# Patient Record
Sex: MALE | Race: ASIAN | NOT HISPANIC OR LATINO | Employment: FULL TIME | URBAN - METROPOLITAN AREA
[De-identification: names, ages, dates, MRNs, and addresses within clinical notes are randomized per-mention and may not be internally consistent; named-entity substitution may affect disease eponyms.]

---

## 2021-01-06 ENCOUNTER — OFFICE VISIT (OUTPATIENT)
Dept: FAMILY MEDICINE | Facility: CLINIC | Age: 36
End: 2021-01-06
Payer: COMMERCIAL

## 2021-01-06 VITALS
HEIGHT: 68 IN | SYSTOLIC BLOOD PRESSURE: 109 MMHG | HEART RATE: 62 BPM | WEIGHT: 113 LBS | TEMPERATURE: 97.6 F | RESPIRATION RATE: 20 BRPM | OXYGEN SATURATION: 97 % | BODY MASS INDEX: 17.13 KG/M2 | DIASTOLIC BLOOD PRESSURE: 75 MMHG

## 2021-01-06 DIAGNOSIS — K21.9 GASTROESOPHAGEAL REFLUX DISEASE WITHOUT ESOPHAGITIS: Primary | ICD-10-CM

## 2021-01-06 PROCEDURE — 99213 OFFICE O/P EST LOW 20 MIN: CPT | Mod: GC | Performed by: STUDENT IN AN ORGANIZED HEALTH CARE EDUCATION/TRAINING PROGRAM

## 2021-01-06 SDOH — HEALTH STABILITY: MENTAL HEALTH: HOW OFTEN DO YOU HAVE A DRINK CONTAINING ALCOHOL?: NOT ASKED

## 2021-01-06 SDOH — HEALTH STABILITY: MENTAL HEALTH: HOW MANY STANDARD DRINKS CONTAINING ALCOHOL DO YOU HAVE ON A TYPICAL DAY?: NOT ASKED

## 2021-01-06 SDOH — HEALTH STABILITY: MENTAL HEALTH: HOW OFTEN DO YOU HAVE 6 OR MORE DRINKS ON ONE OCCASION?: NOT ASKED

## 2021-01-06 ASSESSMENT — MIFFLIN-ST. JEOR: SCORE: 1407.57

## 2021-01-06 NOTE — PATIENT INSTRUCTIONS
Patient Education     GERD (Adult)    The esophagus is a tube that carries food from the mouth to the stomach. A valve (the LES, lower esophageal sphincter) at the lower end of the esophagus prevents stomach acid from flowing upward. When this valve doesn't work properly, stomach contents may repeatedly flow back up (reflux) into the esophagus. This is called gastroesophageal reflux disease (GERD). GERD can irritate the esophagus. It can cause problems with pain, swallowing or breathing. In severe cases, GERD can cause recurrent pneumonia (from aspiration or breathing in particles) or other serious problems.  Symptoms of reflux include burning, pressure or sharp pain in the upper abdomen or mid to lower chest. The pain can spread to the neck, back, or shoulder. There may be belching, an acid taste in the back of the throat, chronic cough, or sore throat, or hoarseness. GERD symptoms often occur during the day after a big meal. They can also occur at night when lying down.   Home care  Lifestyle changes can help reduce symptoms. If needed, your healthcare provider may prescribe medicines. Symptoms often improve with treatment, but if treatment is stopped, the symptoms often return after a few months. So most persons with GERD will need to continue treatment or get treatment on and off.  Lifestyle changes    Limit or avoid fatty, fried, and spicy foods, as well as coffee, chocolate, mint, and foods with high acid content such as tomatoes and citrus fruit and juices (orange, grapefruit, lemon).    Don t eat large meals, especially at night. Frequent, smaller meals are best. Don't lie down right after eating. And don t eat anything 3 hours before going to bed.    Don't drink alcohol or smoke. As much as possible, stay away from second hand smoke.    If you are overweight, losing weight will reduce symptoms.     Don't wear tight clothing around your stomach area.    If your symptoms occur during sleep, use a foam wedge  "to elevate your upper body (not just your head.) Or, place 4\" blocks under the head of your bed. Or use 2 bed risers under your bedframe.  Medicines  If needed, medicines can help relieve the symptoms of GERD and prevent damage to the esophagus. Discuss a medicine plan with your healthcare provider. This may include one or more of the following medicines:    Antacids to help neutralize the normal acids in your stomach.    Acid blockers (Histamine or H2 blockers) to decrease acid production.    Acid inhibitors (proton pump inhibitors PPIs) to decrease acid production in a different way than the blockers. They may work better, but can take a little longer to take effect.  Take an antacid 30 to 60 minutes after eating and at bedtime, but not at the same time as an acid blocker.  Try not to take medicines such as ibuprofen and aspirin. If you are taking aspirin for your heart or other medical reasons, talk to your healthcare provider about stopping it.  Follow-up care  Follow up with your healthcare provider or as advised by our staff.  When to seek medical advice  Call your healthcare provider if any of the following occur:    Stomach pain gets worse or moves to the lower right abdomen (appendix area)    Chest pain appears or gets worse, or spreads to the back, neck, shoulder, or arm    An over-the-counter trial of medicine doesn't relieve your symptoms    Weight loss that can't be explained    Trouble or pain swallowing    Frequent vomiting (can t keep down liquids)    Blood in the stool or vomit (red or black in color)    Feeling weak or dizzy    Fever of 100.4 F (38 C) or higher, or as directed by your healthcare provider  [place] last reviewed this educational content on 3/1/2018    2538-6229 [employer]. [address] [place] [address], [place] [address]. All rights reserved. This information is not intended as a substitute for professional medical care. Always follow your healthcare professional's " instructions.

## 2021-01-06 NOTE — PROGRESS NOTES
HPI:       Mando Hebert is a 35 year old male presents for the new concern(s) of.    Chief Complaint   Patient presents with     [facility]     Gastrophageal Reflux     burning acid reflux, [id number]     Medication Reconciliation     Completed        ABDOMINAL   Pain     Onset: 3 weeks, unsure if associated for feeds    Description:   Character: Burning  Location: epigastric region  Radiation: sometimes radiates to lower right    Intensity: moderate    Progression of Symptoms:  improving    Accompanying Signs & Symptoms:  Fever/Chills?: No   Gas/Bloating:  YES   Dysuria:No   Hematuria: No   Nausea: No   Vomiting: No   Diarrhea:No   Constipation: Regular bowel movements; admits to constipation 1-2x week    History:           Trauma: No   No surgeries  Previous similar pain:  Yes - 2019, not as bad, resolved  Previous tests done: none    Precipitating factors:   Does the pain change with:     Food?: no     BM?: sometimes     Urination:no     What makes it better?: TUMS    Therapies Tried and outcome: Nothing    Medication Reconciliation completed    No  was used for this visit.          Review of Systems:     C: NEGATIVE for fatigue, unexpected change in weight  E: NEGATIVE for acute vision problems or changes  R: NEGATIVE for significant cough or shortness of breath  CV: NEGATIVE for chest pain, palpitations or new or worsening peripheral edema  P: NEGATIVE for changes in mood or affect            PMHX:     Patient Active Problem List   Diagnosis     Atopic dermatitis     Neck mass       No current outpatient medications on file.              Allergies   Allergen Reactions     Nkda [No Known Drug Allergies]        No results found for this or any previous visit (from the past 24 hour(s)).            Physical Exam:     Vitals:    01/06/21 1115   BP: 109/75   BP Location: Right arm   Patient Position: Sitting   Cuff Size: Adult Regular   Pulse: 62   Resp: 20   Temp: 97.6  F (36.4  C)   TempSrc:  "Oral   SpO2: 97%   Weight: 51.3 kg (113 lb)   Height: 1.72 m (5' 7.72\")     Body mass index is 17.33 kg/m .    GENERAL APPEARANCE: healthy, alert and no distress,  RESP: lungs clear to auscultation - no rales, rhonchi or wheezes  CV: regular rate and rhythm,  and no murmur, click,  rub or gallop  ABDOMEN: soft, tenderness in the epigastrium, without hepatosplenomegaly or masses  MS: extremities normal- no gross deformities noted  SKIN: no suspicious lesions or rashes      Assessment and Plan     (K21.9) Gastroesophageal reflux disease without esophagitis  (primary encounter diagnosis)  Comment: HPI and physical examination suggestive of GERD. No evidence of fevers, nausea, vomiting, bowel or bladder concerns. At this point, less likely appendicitis, pancreatitis, peritonitis, cholecystitis, pyelonephritis due to unremarkable HPI and abdominal exam. Discussed this with the patient and recommended initiation of PPI for GERD. Mando is agreeable to this.    Plan:   -Trial of 14-day of esomeprazole (NEXIUM) 20 MG DR capsule  -Follow-up of symptoms on 02/08/2021; will consider H. Pylori testing if epigastric pain continues.    Options for treatment and follow-up care were reviewed with the patient and/or guardian. Pt and/or guardian engaged in the decision making process and verbalized understanding of the options discussed and agreed with the final plan.    Precepted today with: MD James Bryant MD, MPH (PGY 3)  [facility] Family Medicine Resident  Pager: [phone]        "

## 2021-01-13 NOTE — PROGRESS NOTES
Preceptor Attestation:  Patient's case reviewed and discussed with VINICIO OWEN MD resident and I evaluated the patient. I agree with written assessment and plan of care.  Supervising Physician:  Cecil Gutierrez MD, MD [facility]

## 2021-01-15 ENCOUNTER — HEALTH MAINTENANCE LETTER (OUTPATIENT)
Age: 36
End: 2021-01-15

## 2021-06-02 ENCOUNTER — RECORDS - HEALTHEAST (OUTPATIENT)
Dept: ADMINISTRATIVE | Facility: CLINIC | Age: 36
End: 2021-06-02

## 2021-09-26 ENCOUNTER — HEALTH MAINTENANCE LETTER (OUTPATIENT)
Age: 36
End: 2021-09-26

## 2022-01-16 ENCOUNTER — HEALTH MAINTENANCE LETTER (OUTPATIENT)
Age: 37
End: 2022-01-16

## 2023-04-23 ENCOUNTER — HEALTH MAINTENANCE LETTER (OUTPATIENT)
Age: 38
End: 2023-04-23

## 2024-03-01 ENCOUNTER — OFFICE VISIT (OUTPATIENT)
Dept: FAMILY MEDICINE | Facility: CLINIC | Age: 39
End: 2024-03-01
Payer: COMMERCIAL

## 2024-03-01 VITALS
TEMPERATURE: 97.8 F | RESPIRATION RATE: 18 BRPM | HEART RATE: 82 BPM | OXYGEN SATURATION: 97 % | SYSTOLIC BLOOD PRESSURE: 126 MMHG | WEIGHT: 117 LBS | BODY MASS INDEX: 18.36 KG/M2 | HEIGHT: 67 IN | DIASTOLIC BLOOD PRESSURE: 82 MMHG

## 2024-03-01 DIAGNOSIS — Z13.220 LIPID SCREENING: ICD-10-CM

## 2024-03-01 DIAGNOSIS — F10.10 ALCOHOL ABUSE, EPISODIC DRINKING BEHAVIOR: ICD-10-CM

## 2024-03-01 DIAGNOSIS — K92.1 MELENA: ICD-10-CM

## 2024-03-01 DIAGNOSIS — R10.13 EPIGASTRIC PAIN: Primary | ICD-10-CM

## 2024-03-01 LAB
ALBUMIN SERPL-MCNC: 3.9 G/DL (ref 3.4–5)
ALP SERPL-CCNC: 68 U/L (ref 40–150)
ALT SERPL W P-5'-P-CCNC: 35 U/L (ref 0–70)
ANION GAP SERPL CALCULATED.3IONS-SCNC: 2 MMOL/L (ref 3–14)
AST SERPL W P-5'-P-CCNC: 29 U/L (ref 0–45)
BILIRUB SERPL-MCNC: 1.2 MG/DL (ref 0.2–1.3)
BUN SERPL-MCNC: 14 MG/DL (ref 7–30)
CALCIUM SERPL-MCNC: 10 MG/DL (ref 8.5–10.1)
CHLORIDE BLD-SCNC: 107 MMOL/L (ref 94–109)
CO2 SERPL-SCNC: 28 MMOL/L (ref 20–32)
CREAT SERPL-MCNC: 1.1 MG/DL (ref 0.66–1.25)
EGFRCR SERPLBLD CKD-EPI 2021: 87 ML/MIN/1.73M2
ERYTHROCYTE [DISTWIDTH] IN BLOOD BY AUTOMATED COUNT: 13.2 % (ref 10–15)
GLUCOSE BLD-MCNC: 120 MG/DL (ref 70–99)
HCT VFR BLD AUTO: 44 % (ref 40–53)
HGB BLD-MCNC: 14.6 G/DL (ref 13.3–17.7)
INR PPP: 1.14 (ref 0.85–1.15)
MCH RBC QN AUTO: 31.1 PG (ref 26.5–33)
MCHC RBC AUTO-ENTMCNC: 33.2 G/DL (ref 31.5–36.5)
MCV RBC AUTO: 94 FL (ref 78–100)
PLATELET # BLD AUTO: 223 10E3/UL (ref 150–450)
POTASSIUM BLD-SCNC: 4.2 MMOL/L (ref 3.4–5.3)
PROT SERPL-MCNC: 7.2 G/DL (ref 6.8–8.8)
RBC # BLD AUTO: 4.7 10E6/UL (ref 4.4–5.9)
SODIUM SERPL-SCNC: 137 MMOL/L (ref 135–145)
WBC # BLD AUTO: 5.8 10E3/UL (ref 4–11)

## 2024-03-01 PROCEDURE — 80053 COMPREHEN METABOLIC PANEL: CPT | Performed by: FAMILY MEDICINE

## 2024-03-01 PROCEDURE — 82728 ASSAY OF FERRITIN: CPT | Performed by: FAMILY MEDICINE

## 2024-03-01 PROCEDURE — 85027 COMPLETE CBC AUTOMATED: CPT | Performed by: FAMILY MEDICINE

## 2024-03-01 PROCEDURE — 36415 COLL VENOUS BLD VENIPUNCTURE: CPT | Performed by: FAMILY MEDICINE

## 2024-03-01 PROCEDURE — 80061 LIPID PANEL: CPT | Performed by: FAMILY MEDICINE

## 2024-03-01 PROCEDURE — 99204 OFFICE O/P NEW MOD 45 MIN: CPT | Performed by: FAMILY MEDICINE

## 2024-03-01 PROCEDURE — 85610 PROTHROMBIN TIME: CPT | Performed by: FAMILY MEDICINE

## 2024-03-01 RX ORDER — OMEPRAZOLE 40 MG/1
40 CAPSULE, DELAYED RELEASE ORAL DAILY
Qty: 30 CAPSULE | Refills: 0 | Status: SHIPPED | OUTPATIENT
Start: 2024-03-01

## 2024-03-01 ASSESSMENT — PAIN SCALES - GENERAL: PAINLEVEL: MILD PAIN (3)

## 2024-03-01 NOTE — PATIENT INSTRUCTIONS
- work on cutting back on alcohol. I'd advise not drinking at all since this could trigger worsening symptoms of abdominal pain and bleeding  - start taking omeprazole 40 mg once daily until you follow up  - avoid ibuprofen since this could increase your risk of bleeding  - okay to take Tums as needed in addition to the omeprazole for belly pain  - Follow up in 2 weeks to reassess your symptoms

## 2024-03-01 NOTE — PROGRESS NOTES
Assessment & Plan     Epigastric pain  Melena  One episode x 2 days earlier this week. Now epigastric pain. History of GERD. Current episode occurred shortly after excessive binge drinking. Has a chronic history of weekend binge drinking. No hematemesis. Vitals WNL. No anemia on labs today. Has now cleared. Associated epigastric pain raises concern for PUD or gastric ulcer or acute gastritis from alcohol use.   - CBC with platelets; Future  - INR; Future  - Comprehensive metabolic panel; Future  - Ferritin; Future  - CBC with platelets  - INR  - Comprehensive metabolic panel  - Ferritin  - omeprazole (PRILOSEC) 40 MG DR capsule; Take 1 capsule (40 mg) by mouth daily  - Alcohol abstinence.  - Avoid NSAIDs.  - Would benefit from endoscopy. Given stable vitals and no anemia, will start PPI and revisit in follow-up.   - Reviewed precautions for follow-up urgently at the ED - recurrent melena, worsening epigastric pain despite PPI use, lightheadedness, racing heart, shortness of breath.      Lipid screening  Due for routine lipid screening.  - Lipid panel; Future  - Lipid panel    Alcohol abuse, episodic drinking behavior  1-2 days per week of heavy drinking to the point of intoxication and black outs. He doesn't know how many drinks he typically has in one sitting. Has tried to cut back and hasn't been able to do so. Not drinking other days of the week.   - Advised quitting alcohol use entirely and briefly discussed strategies to avoid drinking. He is not yet ready to quit but will cut back his use while treating his abdominal pain and melena.      I spent a total of 37 minutes on the day of the visit.   Time spent by me doing chart review, history and exam, documentation and further activities per the note    Follow up in 2 weeks or sooner if any recurrent or worsening symptoms.    Subjective   Bob is a 38 year old, presenting for the following health issues:  Consult (Stomach pain that comes and goes has had black  "stool )      HPI     Here today for assessment of abdominal pain and dark stools. Onset was suddenly late Tues (3 days ago) and early Wednesday (2 days ago). It is coming and going. Not affected by eating unless he eats too much. Then he feels bloated. Worse at night. Worse at night when he lies flat. Upright position helps. He has been taking a lot of Tums with some relief. 2 Tums every 4 hours. Had a bloody stool starting 6 days ago. His stool was solid black. It took two days of multiple black stools to clear. Since then he has continued to have bowel movements that are normal. Some bloating and gas now. No dysphagia.    History significant for the fact that he was on vacation last week in [place] and drinking more than normal. A lot of hard liquor. He drank enough to be intoxicated for 4 days starting 10 days ago and ending 6 days ago. He vomited once during a period of intoxication and it was food and brownish, no apparent blood. Took oral rehydration salts when he was hungover in [place].     No fever/chills. No known sick contacts. Normal appetite. No recent unexpected weight loss. No lightheadedness or chest pain.     No previous issues with GI ulcers or bleeding previously. History of GERD. Has been prescribed a PPI in the past and used these transiently.     He drinks heavily 1-2 times per week. He drinks to the point of intoxication and often blacks out. He doesn't drink during the day or morning. He drinks whenever it is around. When he has responsibilities, he doesn't drink excessively because he keeps himself separate from alcohol. Has tried to quit before and it hasn't been successful. Not wanting to quit right now.           Objective    /82 (BP Location: Left arm, Patient Position: Sitting, Cuff Size: Adult Regular)   Pulse 82   Temp 97.8  F (36.6  C) (Oral)   Resp 18   Ht 1.702 m (5' 7\")   Wt 53.1 kg (117 lb)   SpO2 97%   BMI 18.32 kg/m    Body mass index is 18.32 kg/m .  Physical " Exam   GENERAL: alert and no distress  HEENT: Anicteric sclera. Conjunctiva clear.   RESP: lungs clear to auscultation - no rales, rhonchi or wheezes  CV: regular rate and rhythm, normal S1 S2, no S3 or S4, no murmur, click or rub, no peripheral edema  ABDOMEN: soft, tenderness to moderate palpation of epigastrium, no hepatosplenomegaly, no masses and bowel sounds normal  MS: no gross musculoskeletal defects noted, no edema  SKIN: no suspicious lesions or rashes    Results for orders placed or performed in visit on 03/01/24 (from the past 24 hour(s))   CBC with platelets   Result Value Ref Range    WBC Count 5.8 4.0 - 11.0 10e3/uL    RBC Count 4.70 4.40 - 5.90 10e6/uL    Hemoglobin 14.6 13.3 - 17.7 g/dL    Hematocrit 44.0 40.0 - 53.0 %    MCV 94 78 - 100 fL    MCH 31.1 26.5 - 33.0 pg    MCHC 33.2 31.5 - 36.5 g/dL    RDW 13.2 10.0 - 15.0 %    Platelet Count 223 150 - 450 10e3/uL   Comprehensive metabolic panel   Result Value Ref Range    Sodium 137 135 - 145 mmol/L    Potassium 4.2 3.4 - 5.3 mmol/L    Chloride 107 94 - 109 mmol/L    Carbon Dioxide (CO2) 28 20 - 32 mmol/L    Anion Gap 2 (L) 3 - 14 mmol/L    Urea Nitrogen 14 7 - 30 mg/dL    Creatinine 1.10 0.66 - 1.25 mg/dL    GFR Estimate 87 >60 mL/min/1.73m2    Calcium 10.0 8.5 - 10.1 mg/dL    Glucose 120 (H) 70 - 99 mg/dL    Alkaline Phosphatase 68 40 - 150 U/L    AST 29 0 - 45 U/L    ALT 35 0 - 70 U/L    Protein Total 7.2 6.8 - 8.8 g/dL    Albumin 3.9 3.4 - 5.0 g/dL    Bilirubin Total 1.2 0.2 - 1.3 mg/dL           Signed Electronically by: Isi Knowles MD

## 2024-03-02 LAB
CHOLEST SERPL-MCNC: 128 MG/DL
FASTING STATUS PATIENT QL REPORTED: NO
FERRITIN SERPL-MCNC: 163 NG/ML (ref 31–409)
HDLC SERPL-MCNC: 42 MG/DL
LDLC SERPL CALC-MCNC: 69 MG/DL
NONHDLC SERPL-MCNC: 86 MG/DL
TRIGL SERPL-MCNC: 84 MG/DL

## 2024-06-30 ENCOUNTER — HEALTH MAINTENANCE LETTER (OUTPATIENT)
Age: 39
End: 2024-06-30

## 2025-07-13 ENCOUNTER — HEALTH MAINTENANCE LETTER (OUTPATIENT)
Age: 40
End: 2025-07-13